# Patient Record
Sex: MALE | Race: WHITE | NOT HISPANIC OR LATINO
[De-identification: names, ages, dates, MRNs, and addresses within clinical notes are randomized per-mention and may not be internally consistent; named-entity substitution may affect disease eponyms.]

---

## 2019-04-04 PROBLEM — Z00.00 ENCOUNTER FOR PREVENTIVE HEALTH EXAMINATION: Status: ACTIVE | Noted: 2019-04-04

## 2020-01-03 ENCOUNTER — APPOINTMENT (OUTPATIENT)
Dept: HEPATOLOGY | Facility: CLINIC | Age: 25
End: 2020-01-03

## 2020-02-19 ENCOUNTER — RESULT REVIEW (OUTPATIENT)
Age: 25
End: 2020-02-19

## 2020-02-20 ENCOUNTER — APPOINTMENT (OUTPATIENT)
Dept: DERMATOLOGY | Facility: CLINIC | Age: 25
End: 2020-02-20
Payer: COMMERCIAL

## 2020-02-20 PROCEDURE — 99201 OFFICE OUTPATIENT NEW 10 MINUTES: CPT | Mod: 25

## 2020-02-20 PROCEDURE — 11102 TANGNTL BX SKIN SINGLE LES: CPT

## 2020-08-28 ENCOUNTER — APPOINTMENT (OUTPATIENT)
Dept: COLORECTAL SURGERY | Facility: CLINIC | Age: 25
End: 2020-08-28
Payer: COMMERCIAL

## 2020-08-28 VITALS
RESPIRATION RATE: 16 BRPM | SYSTOLIC BLOOD PRESSURE: 123 MMHG | DIASTOLIC BLOOD PRESSURE: 85 MMHG | WEIGHT: 285 LBS | BODY MASS INDEX: 38.6 KG/M2 | HEIGHT: 72 IN | HEART RATE: 101 BPM

## 2020-08-28 DIAGNOSIS — Z87.19 PERSONAL HISTORY OF OTHER DISEASES OF THE DIGESTIVE SYSTEM: ICD-10-CM

## 2020-08-28 DIAGNOSIS — Z87.898 PERSONAL HISTORY OF OTHER SPECIFIED CONDITIONS: ICD-10-CM

## 2020-08-28 DIAGNOSIS — R14.0 ABDOMINAL DISTENSION (GASEOUS): ICD-10-CM

## 2020-08-28 DIAGNOSIS — R19.4 CHANGE IN BOWEL HABIT: ICD-10-CM

## 2020-08-28 DIAGNOSIS — Z78.9 OTHER SPECIFIED HEALTH STATUS: ICD-10-CM

## 2020-08-28 DIAGNOSIS — Z80.0 FAMILY HISTORY OF MALIGNANT NEOPLASM OF DIGESTIVE ORGANS: ICD-10-CM

## 2020-08-28 PROCEDURE — 46940 TREATMENT OF ANAL FISSURE: CPT

## 2020-08-28 PROCEDURE — 99243 OFF/OP CNSLTJ NEW/EST LOW 30: CPT | Mod: 25

## 2020-08-28 RX ORDER — NITROGLYCERIN 4 MG/G
0.4 OINTMENT RECTAL TWICE DAILY
Qty: 1 | Refills: 0 | Status: ACTIVE | COMMUNITY
Start: 2020-08-28 | End: 1900-01-01

## 2020-08-28 RX ORDER — LIDOCAINE HYDROCHLORIDE 30 MG/G
3 CREAM TOPICAL
Qty: 1 | Refills: 10 | Status: ACTIVE | COMMUNITY
Start: 2020-08-28 | End: 1900-01-01

## 2020-08-31 ENCOUNTER — RESULT REVIEW (OUTPATIENT)
Age: 25
End: 2020-08-31

## 2020-08-31 PROBLEM — Z87.898 HISTORY OF ABDOMINAL PAIN: Status: RESOLVED | Noted: 2020-08-28 | Resolved: 2020-08-31

## 2020-08-31 PROBLEM — Z80.0 FAMILY HISTORY OF LIVER CANCER: Status: ACTIVE | Noted: 2020-08-28

## 2020-08-31 PROBLEM — Z87.19 HISTORY OF CONSTIPATION: Status: RESOLVED | Noted: 2020-08-28 | Resolved: 2020-08-31

## 2020-08-31 PROBLEM — R19.4 CHANGE IN BOWEL HABIT: Status: RESOLVED | Noted: 2020-08-28 | Resolved: 2020-08-31

## 2020-08-31 PROBLEM — R14.0 BLOATING: Status: RESOLVED | Noted: 2020-08-28 | Resolved: 2020-08-31

## 2020-08-31 PROBLEM — Z87.19 HISTORY OF RECTAL BLEEDING: Status: RESOLVED | Noted: 2020-08-28 | Resolved: 2020-08-31

## 2020-08-31 PROBLEM — Z87.898 HISTORY OF HEARTBURN: Status: RESOLVED | Noted: 2020-08-28 | Resolved: 2020-08-31

## 2020-08-31 PROBLEM — Z87.898 HISTORY OF DIARRHEA: Status: RESOLVED | Noted: 2020-08-28 | Resolved: 2020-08-31

## 2020-08-31 PROBLEM — Z78.9 DOES NOT USE ILLICIT DRUGS: Status: ACTIVE | Noted: 2020-08-28

## 2020-08-31 RX ORDER — HYDROCORTISONE 10 MG/G
1 CREAM TOPICAL
Refills: 0 | Status: ACTIVE | COMMUNITY

## 2020-08-31 RX ORDER — DEXLANSOPRAZOLE 60 MG/1
CAPSULE, DELAYED RELEASE ORAL
Refills: 0 | Status: ACTIVE | COMMUNITY

## 2020-09-01 ENCOUNTER — APPOINTMENT (OUTPATIENT)
Dept: DERMATOLOGY | Facility: CLINIC | Age: 25
End: 2020-09-01
Payer: COMMERCIAL

## 2020-09-01 PROCEDURE — 99213 OFFICE O/P EST LOW 20 MIN: CPT | Mod: 25

## 2020-09-01 PROCEDURE — 11102 TANGNTL BX SKIN SINGLE LES: CPT

## 2020-09-01 PROCEDURE — D0125: CPT

## 2020-09-01 PROCEDURE — 11103 TANGNTL BX SKIN EA SEP/ADDL: CPT

## 2020-10-02 ENCOUNTER — APPOINTMENT (OUTPATIENT)
Dept: COLORECTAL SURGERY | Facility: CLINIC | Age: 25
End: 2020-10-02
Payer: COMMERCIAL

## 2020-10-02 PROCEDURE — 99213 OFFICE O/P EST LOW 20 MIN: CPT

## 2020-10-02 NOTE — HISTORY OF PRESENT ILLNESS
[FreeTextEntry1] :  24 yr old male with recent complaints of anal pain and bleeding after episode of constipation. symptoms Have improved with medical treatment with less pain and bleeding

## 2020-10-02 NOTE — PHYSICAL EXAM
[Abdomen Masses] : No abdominal masses [Abdomen Tenderness] : ~T No ~M abdominal tenderness [Tender] : nontender [Normal rectal exam] : exam was normal [Excoriation] : no perianal excoriation [Fistula] : no fistulas [Manually Reducible] : a manually reducible (grade III) [Tender, Swollen] : tender, swollen [Tight] : was tight [Posterior] : posteriorly [None] : there was no rectal mass  [JVD] : no jugular venous distention  [Normal Breath Sounds] : Normal breath sounds [Normal Heart Sounds] : normal heart sounds [Normal Rate and Rhythm] : normal rate and rhythm [No Rash or Lesion] : No rash or lesion [Alert] : alert [Oriented to Person] : oriented to person [Oriented to Place] : oriented to place [Oriented to Time] : oriented to time [Calm] : calm [de-identified] : looks in moderate disress due to anal pain [de-identified] : latanya barbosa [de-identified] : moves all 4

## 2020-10-02 NOTE — CONSULT LETTER
[Please see my note below.] : Please see my note below. [Consult Closing:] : Thank you very much for allowing me to participate in the care of this patient.  If you have any questions, please do not hesitate to contact me. [Sincerely,] : Sincerely, [FreeTextEntry3] : Chidi Arnold MD

## 2020-10-02 NOTE — REVIEW OF SYSTEMS
[As Noted in HPI] : as noted in HPI [Constipation] : constipation [Negative] : Heme/Lymph [FreeTextEntry7] : less anal pain and bleeding

## 2020-10-02 NOTE — ASSESSMENT
[FreeTextEntry1] : 24 yr old male with Resolving anal fissure, anal pain and bleeding and constipation. recommend  anal fissure, nitroglycerine oint bid, sitz baths, stool softners, laxatives prn

## 2021-03-02 ENCOUNTER — APPOINTMENT (OUTPATIENT)
Dept: DERMATOLOGY | Facility: CLINIC | Age: 26
End: 2021-03-02

## 2021-10-12 ENCOUNTER — APPOINTMENT (OUTPATIENT)
Dept: COLORECTAL SURGERY | Facility: CLINIC | Age: 26
End: 2021-10-12
Payer: COMMERCIAL

## 2021-10-12 PROCEDURE — 99213 OFFICE O/P EST LOW 20 MIN: CPT | Mod: 25

## 2021-10-12 PROCEDURE — 99072 ADDL SUPL MATRL&STAF TM PHE: CPT

## 2021-10-12 PROCEDURE — 46942 TREATMENT OF ANAL FISSURE: CPT

## 2021-10-12 NOTE — ASSESSMENT
[FreeTextEntry1] : 25 yr old male with Recurrent anal fissure, anal pain and bleeding and constipation. recommend cauterization of anal fissure, Diltiazem oint bid, sitz baths, stool softners, laxatives prn

## 2021-10-12 NOTE — HISTORY OF PRESENT ILLNESS
[FreeTextEntry1] :  25 yr old male with recent complaints of anal pain and bleeding after episode of constipation. symptoms are worsening, pain is 10/10 level, Past history of anal fissure

## 2021-10-12 NOTE — PHYSICAL EXAM
[Abdomen Masses] : No abdominal masses [Abdomen Tenderness] : ~T No ~M abdominal tenderness [Tender] : nontender [Normal rectal exam] : exam was normal [Excoriation] : no perianal excoriation [Fistula] : no fistulas [Manually Reducible] : a manually reducible (grade III) [Tender, Swollen] : tender, swollen [Tight] : was tight [Posterior] : posteriorly [None] : there was no rectal mass  [JVD] : no jugular venous distention  [Normal Breath Sounds] : Normal breath sounds [Normal Heart Sounds] : normal heart sounds [Normal Rate and Rhythm] : normal rate and rhythm [No Rash or Lesion] : No rash or lesion [Alert] : alert [Oriented to Person] : oriented to person [Oriented to Place] : oriented to place [Oriented to Time] : oriented to time [Calm] : calm [de-identified] : looks in moderate disress due to anal pain [de-identified] : latanya barbosa [de-identified] : moves all 4

## 2021-10-12 NOTE — REVIEW OF SYSTEMS
[As Noted in HPI] : as noted in HPI [Constipation] : constipation [Negative] : Heme/Lymph [FreeTextEntry7] : anal pain and bleeding

## 2021-11-09 ENCOUNTER — APPOINTMENT (OUTPATIENT)
Dept: COLORECTAL SURGERY | Facility: CLINIC | Age: 26
End: 2021-11-09
Payer: COMMERCIAL

## 2021-11-09 DIAGNOSIS — K62.89 OTHER SPECIFIED DISEASES OF ANUS AND RECTUM: ICD-10-CM

## 2021-11-09 DIAGNOSIS — K60.2 ANAL FISSURE, UNSPECIFIED: ICD-10-CM

## 2021-11-09 PROCEDURE — 99072 ADDL SUPL MATRL&STAF TM PHE: CPT

## 2021-11-09 PROCEDURE — 99213 OFFICE O/P EST LOW 20 MIN: CPT

## 2021-11-10 PROBLEM — K62.89 ANAL PAIN: Status: ACTIVE | Noted: 2020-08-28

## 2021-11-10 PROBLEM — K60.2 ANAL FISSURE: Status: ACTIVE | Noted: 2020-08-28

## 2021-11-10 NOTE — HISTORY OF PRESENT ILLNESS
[FreeTextEntry1] :  26 yr old male with past complaints of anal pain and bleeding after episode of constipation. symptoms have improved with medical treatment of fissure

## 2021-11-10 NOTE — REVIEW OF SYSTEMS
[As Noted in HPI] : as noted in HPI [Constipation] : constipation [Negative] : Heme/Lymph [FreeTextEntry7] : anal pain and bleeding have improved

## 2021-11-10 NOTE — PHYSICAL EXAM
[Abdomen Masses] : No abdominal masses [Abdomen Tenderness] : ~T No ~M abdominal tenderness [Tender] : nontender [Normal rectal exam] : exam was normal [Excoriation] : no perianal excoriation [Fistula] : no fistulas [Manually Reducible] : a manually reducible (grade III) [Tender, Swollen] : tender, swollen [Tight] : was tight [Posterior] : posteriorly [None] : there was no rectal mass  [JVD] : no jugular venous distention  [Normal Breath Sounds] : Normal breath sounds [Normal Heart Sounds] : normal heart sounds [Normal Rate and Rhythm] : normal rate and rhythm [No Rash or Lesion] : No rash or lesion [Alert] : alert [Oriented to Person] : oriented to person [Oriented to Place] : oriented to place [Oriented to Time] : oriented to time [Calm] : calm [de-identified] : almost healed [de-identified] : looks in moderate disress due to anal pain [de-identified] : latanya barbosa [de-identified] : moves all 4

## 2021-11-10 NOTE — ASSESSMENT
[FreeTextEntry1] : 26 yr old male with healing anal fissure, with less anal pain and bleeding and constipation. recommend , Diltiazem oint bid, sitz baths, stool softners, laxatives prn

## 2021-12-27 ENCOUNTER — TRANSCRIPTION ENCOUNTER (OUTPATIENT)
Age: 26
End: 2021-12-27

## 2025-08-12 ENCOUNTER — NON-APPOINTMENT (OUTPATIENT)
Age: 30
End: 2025-08-12

## 2025-08-12 ENCOUNTER — APPOINTMENT (OUTPATIENT)
Dept: COLORECTAL SURGERY | Facility: CLINIC | Age: 30
End: 2025-08-12
Payer: COMMERCIAL

## 2025-08-12 VITALS
WEIGHT: 275 LBS | SYSTOLIC BLOOD PRESSURE: 138 MMHG | HEART RATE: 81 BPM | DIASTOLIC BLOOD PRESSURE: 90 MMHG | BODY MASS INDEX: 37.25 KG/M2 | OXYGEN SATURATION: 98 % | HEIGHT: 72 IN | RESPIRATION RATE: 14 BRPM

## 2025-08-12 DIAGNOSIS — K60.2 ANAL FISSURE, UNSPECIFIED: ICD-10-CM

## 2025-08-12 DIAGNOSIS — K62.89 OTHER SPECIFIED DISEASES OF ANUS AND RECTUM: ICD-10-CM

## 2025-08-12 PROCEDURE — 99204 OFFICE O/P NEW MOD 45 MIN: CPT | Mod: 25

## 2025-08-12 PROCEDURE — 46940 TREATMENT OF ANAL FISSURE: CPT
